# Patient Record
Sex: MALE | Race: WHITE | HISPANIC OR LATINO | Employment: FULL TIME | URBAN - METROPOLITAN AREA
[De-identification: names, ages, dates, MRNs, and addresses within clinical notes are randomized per-mention and may not be internally consistent; named-entity substitution may affect disease eponyms.]

---

## 2024-03-14 ENCOUNTER — HOSPITAL ENCOUNTER (EMERGENCY)
Facility: HOSPITAL | Age: 39
Discharge: HOME | End: 2024-03-14
Attending: EMERGENCY MEDICINE
Payer: OTHER GOVERNMENT

## 2024-03-14 VITALS
BODY MASS INDEX: 32.98 KG/M2 | WEIGHT: 168 LBS | RESPIRATION RATE: 16 BRPM | OXYGEN SATURATION: 97 % | DIASTOLIC BLOOD PRESSURE: 77 MMHG | SYSTOLIC BLOOD PRESSURE: 111 MMHG | HEIGHT: 60 IN | TEMPERATURE: 96.9 F | HEART RATE: 90 BPM

## 2024-03-14 DIAGNOSIS — B34.9 ACUTE VIRAL SYNDROME: Primary | ICD-10-CM

## 2024-03-14 DIAGNOSIS — H61.22 IMPACTED CERUMEN OF LEFT EAR: ICD-10-CM

## 2024-03-14 LAB
FLUAV RNA RESP QL NAA+PROBE: NOT DETECTED
FLUBV RNA RESP QL NAA+PROBE: NOT DETECTED
RSV RNA RESP QL NAA+PROBE: NOT DETECTED
S PYO AG THROAT QL: NEGATIVE
SARS-COV-2 RNA RESP QL NAA+PROBE: NOT DETECTED

## 2024-03-14 PROCEDURE — 87637 SARSCOV2&INF A&B&RSV AMP PRB: CPT | Performed by: NURSE PRACTITIONER

## 2024-03-14 PROCEDURE — 87081 CULTURE SCREEN ONLY: CPT | Performed by: NURSE PRACTITIONER

## 2024-03-14 PROCEDURE — 87880 STREP A ASSAY W/OPTIC: CPT | Performed by: NURSE PRACTITIONER

## 2024-03-14 PROCEDURE — 99283 EMERGENCY DEPT VISIT LOW MDM: CPT

## 2024-03-14 RX ADMIN — CARBAMIDE PEROXIDE 6.5% 10 DROP: 6.5 LIQUID AURICULAR (OTIC) at 15:57

## 2024-03-14 NOTE — Clinical Note
[facility] EMERGENCY DEPARTMENT  [address] [facility] [address]  Phone: [phone]    Chip Mariee was seen and treated in our emergency department on 3/14/2024.  He may return to work on 03/15/2024.         Thank you for choosing [facility].    Ellen Nolan APRN

## 2024-03-14 NOTE — ED PROVIDER NOTES
EMERGENCY DEPARTMENT ENCOUNTER    Room Number:  T01/01  Date seen:  3/14/2024  PCP: Provider, No Known  Historian/Independent historian: n/a  Chronic or social conditions impacting care: n/a      HPI:  Chief Complaint: sore throat  A complete HPI/ROS/PMH/PSH/SH/FH are unobtainable due to: n/a  Context: Chip Mariee is a 38 y.o. male who presents to the ED c/o 3-day history of sore throat with 1 episode of vomiting, nonproductive cough, left ear pain.  He denies any fever, chills, chest pain, shortness of breath, abdominal pain, urinary complaints.  No known sick contacts.    External Medical record review:   No external medical records available for review in [facility] or Care Everywhere      PAST MEDICAL HISTORY  Active Ambulatory Problems     Diagnosis Date Noted    No Active Ambulatory Problems     Resolved Ambulatory Problems     Diagnosis Date Noted    No Resolved Ambulatory Problems     No Additional Past Medical History         PAST SURGICAL HISTORY  No past surgical history on file.      FAMILY HISTORY  No family history on file.      SOCIAL HISTORY  Social History     Socioeconomic History    Marital status: Single         ALLERGIES  Patient has no known allergies.        REVIEW OF SYSTEMS  Per HPI, otherwise negative.       PHYSICAL EXAM  ED Triage Vitals [03/14/24 1313]   Temp Heart Rate Resp BP SpO2   96.9 °F (36.1 °C) 90 16 111/77 97 %      Temp src Heart Rate Source Patient Position BP Location FiO2 (%)   -- -- -- -- --       Physical Exam  Vitals and nursing note reviewed.   Constitutional:       Appearance: Normal appearance. He is well-developed.   HENT:      Head: Normocephalic and atraumatic.      Right Ear: Tympanic membrane and ear canal normal.      Left Ear: There is impacted cerumen.      Mouth/Throat:      Mouth: Mucous membranes are moist.      Pharynx: No oropharyngeal exudate or posterior oropharyngeal erythema.   Eyes:      Conjunctiva/sclera: Conjunctivae normal.   Cardiovascular:       Rate and Rhythm: Normal rate and regular rhythm.      Pulses: Normal pulses.      Heart sounds: Normal heart sounds.   Pulmonary:      Effort: Pulmonary effort is normal.      Breath sounds: Normal breath sounds. No wheezing or rhonchi.   Abdominal:      General: Bowel sounds are normal.      Palpations: Abdomen is soft.      Tenderness: There is no abdominal tenderness. There is no guarding.   Skin:     General: Skin is warm.      Capillary Refill: Capillary refill takes less than 2 seconds.   Neurological:      Mental Status: He is alert and oriented to person, place, and time. Mental status is at baseline.   Psychiatric:         Mood and Affect: Mood normal.               LAB RESULTS  Recent Results (from the past 24 hour(s))   Rapid Strep A Screen - Swab, Throat    Collection Time: 03/14/24  2:55 PM    Specimen: Throat; Swab   Result Value Ref Range    Strep A Ag Negative Negative   COVID-19, FLU A/B, RSV PCR 1 HR TAT - Swab, Nasopharynx    Collection Time: 03/14/24  2:55 PM    Specimen: Nasopharynx; Swab   Result Value Ref Range    COVID19 Not Detected Not Detected - Ref. Range    Influenza A PCR Not Detected Not Detected    Influenza B PCR Not Detected Not Detected    RSV, PCR Not Detected Not Detected       Ordered the above labs and reviewed the results.        RADIOLOGY  No Radiology Exams Resulted Within Past 24 Hours    Ordered the above noted radiological studies. Reviewed by me in PACS.        MEDICATIONS GIVEN IN ER  Medications   carbamide peroxide (DEBROX) 6.5 % otic solution 10 drop (has no administration in time range)           MEDICAL DECISION MAKING, PROGRESS, and CONSULTS    All labs have been independently reviewed by me.  All radiology studies have been reviewed by me and I have also reviewed the radiology report.   EKG's independently viewed and interpreted by me.  Discussion below represents my analysis of pertinent findings related to patient's condition, differential diagnosis, treatment  plan and final disposition.    38-year-old male who presents with viral-like symptoms.  Strep, flu, COVID, RSV negative.  Left impacted cerumen noted, Debrox drops given and recommended patient follow-up for further evaluation.  Treat symptoms with OTC medications, rest, drink plenty of fluids.        Shared decision making/consideration for admission:  discharge      Orders placed during this visit:  Orders Placed This Encounter   Procedures    Rapid Strep A Screen - Swab, Throat    COVID-19, FLU A/B, RSV PCR 1 HR TAT - Swab, Nasopharynx    Beta Strep Culture, Throat - Swab, Throat         Differential diagnosis include, but not limited to: viral syndrome, strep, allergic rhinitis       Additional orders considered but not ordered: flonase    Independent interpretation of labs, radiology studies, and discussions with consultants:    Discussed with Dr. Quick, who agrees with plan.     ED Course as of 03/14/24 1548   Thu Mar 14, 2024   1543 Strep A Ag: Negative [JG]   1543 COVID19: Not Detected [JG]   1543 Influenza A PCR: Not Detected [JG]   1543 Influenza B PCR: Not Detected [JG]   1543 RSV, PCR: Not Detected [JG]      ED Course User Index  [JG] Ellen Nolan APRN             DIAGNOSIS  Final diagnoses:   Acute viral syndrome   Impacted cerumen of left ear         DISPOSITION  discharge      Latest Documented Vital Signs:  As of 15:48 EDT  BP- 111/77 HR- 90 Temp- 96.9 °F (36.1 °C) O2 sat- 97%              --    Please note that portions of this were completed with a voice recognition program.       Note Disclaimer: At [facility], we believe that sharing information builds trust and better relationships. You are receiving this note because you are receiving care at [facility] or recently visited. It is possible you will see health information before a provider has talked with you about it. This kind of information can be easy to misunderstand. To help you fully understand what it means for your  health, we urge you to discuss this note with your provider.             Ellen Nolan APRN  03/14/24 2009

## 2024-03-14 NOTE — ED PROVIDER NOTES
MD ATTESTATION NOTE    SHARED VISIT: This visit was performed by BOTH a physician and an APC. The substantive portion of the medical decision making was performed by this attesting physician who made or approved the management plan and takes responsibility for patient management. All studies in the APC note (if performed) were independently interpreted by me.     The CECILIA and I have discussed this patient's history, physical exam, and treatment plan.  I have reviewed the documentation and affirm the documentation and agree with the treatment and plan.  The attached note describes my personal findings.      Independent Historians: Patient    A complete HPI/ROS/PMH/PSH/SH/FH are unobtainable due to: None    Chronic or social conditions impacting patient care (social determinants of health): None flulike symptoms for the last 3 days.  He started with sore throat    Chip Mariee is a 38 y.o. male who presents to the ED c/o acute and vomiting and now has developed nonproductive cough and some left ear pain with worsening of his sore throat.  Patient denies any fever, chills, diarrhea.  He has no specific sick contacts.  He denies any recent antibiotics          On exam:  GENERAL: Pleasant cooperative and conversant male, normal phonation and voice, no drooling, no stridor, alert, no acute distress  SKIN: Warm, dry  HENT: Normocephalic, atraumatic, posterior oropharynx with erythema and edema that is symmetric, no uvula shift, no elevation of the floor the mouth, no tongue protrusion  EYES: no scleral icterus, EOMI, no conjunctivitis  Neck: Left anterior cervical lymph node swelling with tenderness to palpation, no submandibular masses, no meningismus, trachea midline  RESPIRATORY: Relaxed breathing  NEURO: alert, moves all extremities, follows commands                                                                 Medical Decision Making:  ED Course as of 03/15/24 1111   Thu Mar 14, 2024   1543 Strep A Ag: Negative  [JG]   1543 COVID19: Not Detected [JG]   1543 Influenza A PCR: Not Detected [JG]   1543 Influenza B PCR: Not Detected [JG]   1543 RSV, PCR: Not Detected [JG]      ED Course User Index  [JG] OvidioEllen APRN       MDM: This patient presents with a constellation of symptoms likely representing viral upper respiratory infection sore throat, cough, ear pain as characterized by:, Vomiting. No chest pain nor shortness of breath. The patient´s presentation is not consistent with other acute cardiopulmonary emergencies like ACS, CHF, or pericardial effusion. Possible COVID-19 (coronavirus), flu, RSV, or any number of other viral illnesses currently at a community peak, but no specific sick contacts, no respiratory compromise, nontoxic appearance.   Other diagnoses were considered in the differential diagnosis for this patient. Considered:  peritonsillar abscess or retropharyngeal abscess or other deep space infection like Ludwigs (but no hot potato voice, no uvular deviation, no submandibular swelling, no elevation of floor of mouth), esophageal rupture/tear (but no history of dysphagia nor forceful vomiting). Also considered CNS infection, bacterial sinusitis, and pneumonia but low likelihood given documented exam and history. Strep or Mononucleosis also thought less likely as no pharyngeal exudates, no posterior lymphadenopathy.  Acute viral syndrome, impacted cerumen of the left ear      Procedures:  Procedures        PPE: I followed hospital protocols for proper PPE based on patient presentation including use of N95 mask for suspected infectious respiratory conditions.  Proper hand hygiene was performed both before and after the patient encounter.          Diagnosis  Final diagnoses:   None   Acute viral syndrome  Impacted cerumen left ear  Disposition: Home  Condition: Stable    Note Disclaimer: At [facility], we believe that sharing information builds trust and better relationships. You are receiving  this note because you recently visited [facility]. It is possible you will see health information before a provider has talked with you about it. This kind of information can be easy to misunderstand. To help you fully understand what it means for your health, we urge you to discuss this note with your provider.       Maite Quick MD  03/15/24 [id number]

## 2024-03-14 NOTE — Clinical Note
[facility] EMERGENCY DEPARTMENT  [address] [facility] [address]  Phone: [phone]    Chip Mariee was seen and treated in our emergency department on 3/14/2024.  He may return to work on 03/15/2024.         Thank you for choosing [facility].    Ellen Nolan APRN

## 2024-03-14 NOTE — DISCHARGE INSTRUCTIONS
Rest and drink plenty of fluids  Tylenol and/or ibuprofen as needed for pain  Use ear drops to help with wax build up in your left ear

## 2024-03-20 LAB — BACTERIA SPEC AEROBE CULT: NORMAL
